# Patient Record
Sex: MALE | Race: WHITE | NOT HISPANIC OR LATINO | Employment: OTHER | URBAN - METROPOLITAN AREA
[De-identification: names, ages, dates, MRNs, and addresses within clinical notes are randomized per-mention and may not be internally consistent; named-entity substitution may affect disease eponyms.]

---

## 2019-01-24 ENCOUNTER — TELEPHONE (OUTPATIENT)
Dept: SCHEDULING | Facility: IMAGING CENTER | Age: 79
End: 2019-01-24

## 2019-02-07 ENCOUNTER — OFFICE VISIT (OUTPATIENT)
Dept: MEDICAL GROUP | Facility: PHYSICIAN GROUP | Age: 79
End: 2019-02-07
Payer: MEDICARE

## 2019-02-07 VITALS
BODY MASS INDEX: 25.11 KG/M2 | DIASTOLIC BLOOD PRESSURE: 76 MMHG | SYSTOLIC BLOOD PRESSURE: 142 MMHG | HEIGHT: 67 IN | RESPIRATION RATE: 14 BRPM | WEIGHT: 160 LBS | HEART RATE: 66 BPM | OXYGEN SATURATION: 97 % | TEMPERATURE: 98.9 F

## 2019-02-07 DIAGNOSIS — K22.70 BARRETT'S ESOPHAGUS WITHOUT DYSPLASIA: ICD-10-CM

## 2019-02-07 DIAGNOSIS — K44.9 HIATAL HERNIA: ICD-10-CM

## 2019-02-07 DIAGNOSIS — R91.8 PULMONARY NODULES: ICD-10-CM

## 2019-02-07 DIAGNOSIS — G83.9 PARALYSIS (HCC): ICD-10-CM

## 2019-02-07 DIAGNOSIS — M06.9 RHEUMATOID ARTHRITIS INVOLVING BOTH HANDS, UNSPECIFIED RHEUMATOID FACTOR PRESENCE: ICD-10-CM

## 2019-02-07 DIAGNOSIS — R60.0 PEDAL EDEMA: ICD-10-CM

## 2019-02-07 DIAGNOSIS — N30.00 ACUTE CYSTITIS WITHOUT HEMATURIA: ICD-10-CM

## 2019-02-07 DIAGNOSIS — K27.9 PEPTIC ULCER: ICD-10-CM

## 2019-02-07 PROCEDURE — 99204 OFFICE O/P NEW MOD 45 MIN: CPT | Performed by: FAMILY MEDICINE

## 2019-02-08 NOTE — ASSESSMENT & PLAN NOTE
He was seen in [facility] on 1/11/2019 -1/13/2019.  He was treated with EGD for bleeding ulcer and discharged with home health.  Discharge diagnosis was h pyhlori positive duodenal ulcer, GI bleed, acute blood loss anemia and UTI.  He required a blood transfusion for the anemia and endoscopy.  Per the discharge summary he was started on quadruple therapy for H pylori and 5 days of antibiotics for his UTI.   He was given 2 weeks of doxycycline and flagyl.

## 2019-02-17 PROBLEM — K44.9 HIATAL HERNIA: Status: ACTIVE | Noted: 2019-02-17

## 2019-02-17 PROBLEM — K22.70 BARRETT'S ESOPHAGUS WITHOUT DYSPLASIA: Status: ACTIVE | Noted: 2019-02-17

## 2019-02-17 PROBLEM — R91.8 PULMONARY NODULES: Status: ACTIVE | Noted: 2019-02-17

## 2019-02-17 PROBLEM — N30.00 ACUTE CYSTITIS WITHOUT HEMATURIA: Status: ACTIVE | Noted: 2019-02-17

## 2019-02-17 RX ORDER — SULFAMETHOXAZOLE AND TRIMETHOPRIM 800; 160 MG/1; MG/1
1 TABLET ORAL 2 TIMES DAILY
COMMUNITY
End: 2019-02-17

## 2019-02-17 RX ORDER — BISMUTH SUBSALICYLATE 262 MG/1
524 TABLET, CHEWABLE ORAL
COMMUNITY
End: 2019-05-07

## 2019-02-17 RX ORDER — PANTOPRAZOLE SODIUM 40 MG/1
40 TABLET, DELAYED RELEASE ORAL 2 TIMES DAILY
COMMUNITY
End: 2019-05-07

## 2019-02-17 RX ORDER — POLYETHYLENE GLYCOL 3350 17 G/17G
17 POWDER, FOR SOLUTION ORAL
COMMUNITY
End: 2019-05-07

## 2019-02-17 RX ORDER — CALCIUM CARBONATE 500 MG/1
500 TABLET, CHEWABLE ORAL PRN
COMMUNITY
End: 2019-05-07

## 2019-02-17 NOTE — ASSESSMENT & PLAN NOTE
A stable 5 mm lateral right lung base pulmonary nodule and 7 mm right middle lobe pulmonary nodule were noted on CT on 1/11/2019 as compared with 11/2018.  Follow up CT recommended in 1 year.

## 2019-02-17 NOTE — PROGRESS NOTES
CC:  Hospital follow up, I need a primary doctor    HISTORY OF THE PRESENT ILLNESS: Patient is a 78 y.o. male. This pleasant patient is here today to establish care after a recent hospitalization    Health Maintenance: Completed      Peptic ulcer  He was seen in [facility] on 1/11/2019 -1/13/2019.  He was treated with EGD for bleeding ulcer and discharged with home health.  Discharge diagnosis was h pyhlori positive duodenal ulcer, GI bleed, acute blood loss anemia and UTI.  He required a blood transfusion for the anemia and endoscopy.  Per the discharge summary he was started on quadruple therapy for H pylori and 5 days of antibiotics for his UTI.   He was given 2 weeks of doxycycline and flagyl.    Acute cystitis without hematuria  This was treated with IV antibiotics during his hospitalization and with 5 days of bactrim on discharge.  He denies symptoms.    Oconnor's esophagus without dysplasia  This was diagnosed on endoscopy on 1/12/2019.  Biopsies were sent.    Hiatal hernia  This was also noted on endoscopy.  Described as small.    Pulmonary nodules  A stable 5 mm lateral right lung base pulmonary nodule and 7 mm right middle lobe pulmonary nodule were noted on CT on 1/11/2019 as compared with 11/2018.  Follow up CT recommended in 1 year.      Paralysis (HCC)  He has a history of polio in 1955 and is currently wheelchair bound.  He still lives alone       Allergies: Patient has no known allergies.    Current Outpatient Prescriptions Ordered in [facility]   Medication Sig Dispense Refill   • calcium carbonate (TUMS) 500 MG Chew Tab Take 500 mg by mouth as needed.     • bismuth subsalicylate (PEPTO-BISMOL) 262 MG Chew Tab Take 524 mg by mouth 4 Times a Day,Before Meals and at Bedtime.     • polyethylene glycol/lytes (MIRALAX) Pack Take 17 g by mouth 1 time daily as needed.     • pantoprazole (PROTONIX) 40 MG Tablet Delayed Response Take 40 mg by mouth 2 Times a Day.       No current [facility]-ordered facility-administered  "medications on file.        Past Medical History:   Diagnosis Date   • Polio 1955       Past Surgical History:   Procedure Laterality Date   • EGD W/CONTROL BLEEDING  2019       Social History   Substance Use Topics   • Smoking status: Former Smoker     Packs/day: 0.30     Years: 60.00     Types: Cigarettes     Quit date: 1/21/2019   • Smokeless tobacco: Never Used   • Alcohol use No       Social History     Social History Narrative    Lives independently, has meals on wheels,        Family History   Problem Relation Age of Onset   • Arthritis Mother    • Other Father         ulcer       ROS:     - Constitutional: Negative for fever, chills, unexpected weight change, and fatigue/generalized weakness.     - HEENT: Negative for headaches, vision changes, hearing changes, ear pain, ear discharge, rhinorrhea, sinus congestion, sore throat, and neck pain.      - Respiratory: Negative for cough, sputum production, chest congestion, dyspnea, wheezing, and crackles.      - Cardiovascular: Negative for chest pain, palpitations, orthopnea, and bilateral lower extremity edema.     - Gastrointestinal: Negative for heartburn, nausea, vomiting, abdominal pain, hematochezia, melena, diarrhea, constipation, and greasy/foul-smelling stools.     - Genitourinary: Negative for dysuria, polyuria, hematuria, pyuria, urinary urgency, and urinary incontinence.    - Musculoskeletal: Negative for myalgias, back pain, and joint pain.     - Skin: Negative for rash, itching, cyanotic skin color change.     - Neurological: Negative for dizziness, tingling, tremors,headaches.     - Endo/Heme/Allergies: Does not bruise/bleed easily.     - Psychiatric/Behavioral: Negative for depression, suicidal/homicidal ideation and memory loss.      Exam: Blood pressure 142/76, pulse 66, temperature 37.2 °C (98.9 °F), resp. rate 14, height 1.702 m (5' 7\"), weight 72.6 kg (160 lb), SpO2 97 %. Body mass index is 25.06 kg/m².    General: well appearing elderly " male in  A power chair. No distress.  HEENT: Normocephalic. Eyes conjunctiva clear lids without ptosis, pupils equal and reactive to light accommodation, ears normal shape and contour, canals are clear bilaterally, tympanic membranes are benign, nasal mucosa benign, oropharynx is without erythema, edema or exudates.   Neck: Supple without JVD or bruit. Thyroid is not enlarged.  Pulmonary: Clear to ausculation.  Normal effort. No rales, ronchi, or wheezing.  Cardiovascular: Regular rate and rhythm without murmur. Carotid and radial pulses are intact and equal bilaterally.  Abdomen: Soft, nontender, nondistended. Normal bowel sounds. Liver and spleen are not palpable  Neurologic: strength in arms intact  Lymph: No cervical, supraclavicular or axillary lymph nodes are palpable  Skin: Warm and dry.  No obvious lesions.  Musculoskeletal -bilateral hands with swelling in MCP joints and mild erythema, bilateral pedal edema  Psych: Normal mood and affect. Alert and oriented x3. Judgment and insight is normal.    Please note that this dictation was created using voice recognition software. I have made every reasonable attempt to correct obvious errors, but I expect that there are errors of grammar and possibly content that I did not discover before finalizing the note.      Assessment/Plan  1. Peptic ulcer  He has been treated for H pylori and is on PPIs.  He denies ongoing pain or bleeding.  He will follow up with GI as scheduled.    2. Paralysis (HCC)  This is chronic and he is not concerned about his ability at home.  He appears to have been set up with homecare which is appropriate.  He also gets meals on wheels.    3. Pedal edema  This may be due to inactivity compounded by anemia.    4. Rheumatoid arthritis involving both hands, unspecified rheumatoid factor presence (HCC)  He is not on active treatment for this.  If this is his diagnosis he probably should be seen by rheumatology to prevent further joint destruction.   He is a relatively poor historian and is alone today.  We will need medical records.    5. Acute cystitis without hematuria  There are no active symptoms at this time.  A bladder diverticula was noted on his CT, this may be why he follows with urology.      6. Oconnor's esophagus without dysplasia  Will need regular EGDs to follow, depending on pathology which is not available at this time.    7. Hiatal hernia  Continue PPI.    8. Pulmonary nodules  These are small and will need CT follow up in a year.

## 2019-02-17 NOTE — ASSESSMENT & PLAN NOTE
This was treated with IV antibiotics during his hospitalization and with 5 days of bactrim on discharge.  He denies symptoms.

## 2019-05-07 ENCOUNTER — OFFICE VISIT (OUTPATIENT)
Dept: MEDICAL GROUP | Facility: PHYSICIAN GROUP | Age: 79
End: 2019-05-07
Payer: MEDICARE

## 2019-05-07 ENCOUNTER — TELEPHONE (OUTPATIENT)
Dept: MEDICAL GROUP | Facility: PHYSICIAN GROUP | Age: 79
End: 2019-05-07

## 2019-05-07 VITALS
HEIGHT: 67 IN | HEART RATE: 63 BPM | TEMPERATURE: 99.1 F | RESPIRATION RATE: 16 BRPM | WEIGHT: 160 LBS | OXYGEN SATURATION: 96 % | BODY MASS INDEX: 25.11 KG/M2 | DIASTOLIC BLOOD PRESSURE: 64 MMHG | SYSTOLIC BLOOD PRESSURE: 118 MMHG

## 2019-05-07 DIAGNOSIS — K27.9 PEPTIC ULCER: ICD-10-CM

## 2019-05-07 DIAGNOSIS — D50.8 OTHER IRON DEFICIENCY ANEMIA: ICD-10-CM

## 2019-05-07 DIAGNOSIS — M06.9 RHEUMATOID ARTHRITIS INVOLVING BOTH HANDS, UNSPECIFIED RHEUMATOID FACTOR PRESENCE: ICD-10-CM

## 2019-05-07 PROBLEM — N30.00 ACUTE CYSTITIS WITHOUT HEMATURIA: Status: RESOLVED | Noted: 2019-02-17 | Resolved: 2019-05-07

## 2019-05-07 PROBLEM — D64.9 ABSOLUTE ANEMIA: Status: ACTIVE | Noted: 2019-05-07

## 2019-05-07 PROCEDURE — 99214 OFFICE O/P EST MOD 30 MIN: CPT | Performed by: FAMILY MEDICINE

## 2019-05-07 NOTE — PROGRESS NOTES
CC: hand swollen    HISTORY OF PRESENT ILLNESS: Patient is a 78 y.o. male established patient who presents today to discuss the following chronic concerns    Health Maintenance: colonoscopy and vaccines declined, he believes he has had colonoscopy before    Rheumatoid arthritis of hand  He was previously treated for this, he believes with Dr. Potter, but stopped going because he felt the medication wasn't helping and the dose was too high.  His hands have been swollen recently, right worse than left.  He denies penetrating or blunt trauma or symptoms of infection.  He is only using topical NSAIDs, no oral medications.    Absolute anemia  He was seen in Dr. Alfaro's office since last visit.  He said he was told his repiratory test was negative but that his blood count was low. He is no longer on any medications.    Peptic ulcer  He denies any abdominal pain or hematochezia or melena.  He is no longer on medications.        Patient Active Problem List    Diagnosis Date Noted   • Absolute anemia 05/07/2019   • Oconnor's esophagus without dysplasia 02/17/2019   • Hiatal hernia 02/17/2019   • Pulmonary nodules 02/17/2019   • Peptic ulcer 02/07/2019   • Paralysis ([facility]) 02/07/2019   • Pedal edema 02/07/2019   • Rheumatoid arthritis of hand ([facility]) 10/02/2015      Allergies:Patient has no known allergies.    Current Outpatient Prescriptions   Medication Sig Dispense Refill   • Diclofenac Sodium 1 % Gel Apply 1 Application to skin as directed 2 times a day. 1 Tube 2     No current facility-administered medications for this visit.        Social History   Substance Use Topics   • Smoking status: Former Smoker     Packs/day: 0.30     Years: 60.00     Types: Cigarettes     Quit date: 1/21/2019   • Smokeless tobacco: Never Used   • Alcohol use No     Social History     Social History Narrative    Lives independently, has meals on wheels,        Family History   Problem Relation Age of Onset   • Arthritis Mother    • Other  "Father         ulcer       Review of Systems:      - Constitutional: Negative for fever, chills, unexpected weight change, and fatigue/generalized weakness.     - Respiratory: Negative for cough, sputum production, chest congestion, dyspnea, wheezing, and crackles.      - Cardiovascular: Negative for chest pain, palpitations, orthopnea, and bilateral lower extremity edema.     - Gastrointestinal: Negative for heartburn, nausea, vomiting, abdominal pain, hematochezia, melena, diarrhea, constipation, and greasy/foul-smelling stools.     - Genitourinary: Negative for dysuria, polyuria, hematuria, pyuria, urinary urgency, and urinary incontinence.    -     Exam:   /64 (BP Location: Left arm, Patient Position: Sitting, BP Cuff Size: Adult)   Pulse 63   Temp 37.3 °C (99.1 °F) (Temporal)   Resp 16   Ht 1.702 m (5' 7\")   Wt 72.6 kg (160 lb)   SpO2 96%  Body mass index is 25.06 kg/m².    General:  Well nourished, well developed male in NAD  Head is grossly normal.  Neck: Supple without JVD or bruit. Thyroid is not enlarged.  Pulmonary: Clear to ausculation and percussion.  Normal effort. No rales, ronchi, or wheezing.  Cardiovascular: Regular rate and rhythm without murmur. Carotid and radial pulses are intact and equal bilaterally.  Extremities: no clubbing, cyanosis, or edema.  Musculoskeletal: deformity of bilateral hands with swelling of the joints and restriction of ROM, right 2nd MCP and PIP joints are swollen without skin changes or increased warmth.  No skin lesions        Assessment/Plan:  1. Peptic ulcer  He is no longer having symptoms and has followed up with GI.  I have asked for records regarding this.  I have advised him to avoid oral NSAIDs and monitor symptoms.  We will follow his CBC for improvement.    2. Rheumatoid arthritis involving both hands, unspecified rheumatoid factor presence (HCC)  He appears to be having an RA flare.  There are no signs of trauma or infectious tenosynovitis.  He may " use topical diclofenac for now and I have advised him that he will likely need a rheumatologist to go forward with treatment of this.  I have ordered an xray and labs to evaluate further.  - Diclofenac Sodium 1 % Gel; Apply 1 Application to skin as directed 2 times a day.  Dispense: 1 Tube; Refill: 2  - DX-HAND 3+ RIGHT; Future  - WESTERGREN SED RATE; Future  - CRP QUANTITIVE (NON-CARDIAC); Future  - RHEUMATOID ARTHRITIS FACTOR; Future    3. Other iron deficiency anemia  Will request previous records, Hg was 8.5 when he arrived during his last admission but I do not have any labs from discharge and he required a transfusion.  Will follow CBC.    - CBC WITH DIFFERENTIAL; Future  - FERRITIN; Future

## 2019-05-07 NOTE — ASSESSMENT & PLAN NOTE
He was previously treated for this, he believes with Dr. Potter, but stopped going because he felt the medication wasn't helping and the dose was too high.  His hands have been swollen recently, right worse than left.  He denies penetrating or blunt trauma or symptoms of infection.  He is only using topical NSAIDs, no oral medications.

## 2019-05-07 NOTE — ASSESSMENT & PLAN NOTE
He was seen in Dr. Alfaro's office since last visit.  He said he was told his repiratory test was negative but that his blood count was low. He is no longer on any medications.

## 2019-05-07 NOTE — LETTER
Critical access hospital  Larissa Gomez M.D.  [address]  [facility] [address]  Fax: [phone]   Authorization for Release/Disclosure of   Protected Health Information   Name: SID RAMIRES : 1940 SSN: [id number]   Address: [address] [place] [address] [facility] [id number] Phone:    [phone] (home)    I authorize the entity listed below to release/disclose the PHI below to:   Critical access hospital/Larissa Gomez M.D. and Larissa Gomez M.D.   Provider or Entity Name:     Address   City, State, Zip   Phone:      Fax:     Reason for request: continuity of care   Information to be released:    [  ] LAST COLONOSCOPY,  including any PATH REPORT and follow-up  [  ] LAST FIT/COLOGUARD RESULT [  ] LAST DEXA  [  ] LAST MAMMOGRAM  [  ] LAST PAP  [  ] LAST LABS [  ] RETINA EXAM REPORT  [  ] IMMUNIZATION RECORDS  [  ] Release all info      [  ] Check here and initial the line next to each item to release ALL health information INCLUDING  _____ Care and treatment for drug and / or alcohol abuse  _____ HIV testing, infection status, or AIDS  _____ Genetic Testing    DATES OF SERVICE OR TIME PERIOD TO BE DISCLOSED: _____________  I understand and acknowledge that:  * This Authorization may be revoked at any time by you in writing, except if your health information has already been used or disclosed.  * Your health information that will be used or disclosed as a result of you signing this authorization could be re-disclosed by the recipient. If this occurs, your re-disclosed health information may no longer be protected by State or Federal laws.  * You may refuse to sign this Authorization. Your refusal will not affect your ability to obtain treatment.  * This Authorization becomes effective upon signing and will  on (date) __________.      If no date is indicated, this Authorization will  one (1) year from the signature date.    Name: Sid Ramires    Signature:   Date:     2019       PLEASE  FAX REQUESTED RECORDS BACK TO: [phone]

## 2019-05-13 ENCOUNTER — TELEPHONE (OUTPATIENT)
Dept: MEDICAL GROUP | Facility: PHYSICIAN GROUP | Age: 79
End: 2019-05-13

## 2019-05-13 DIAGNOSIS — M06.9 RHEUMATOID ARTHRITIS INVOLVING BOTH HANDS, UNSPECIFIED RHEUMATOID FACTOR PRESENCE: ICD-10-CM

## 2019-05-13 PROBLEM — D50.0 IRON DEFICIENCY ANEMIA DUE TO CHRONIC BLOOD LOSS: Status: ACTIVE | Noted: 2019-05-07

## 2020-01-02 ENCOUNTER — OFFICE VISIT (OUTPATIENT)
Dept: MEDICAL GROUP | Facility: PHYSICIAN GROUP | Age: 80
End: 2020-01-02
Payer: MEDICARE

## 2020-01-02 VITALS
HEART RATE: 60 BPM | WEIGHT: 160 LBS | BODY MASS INDEX: 22.9 KG/M2 | TEMPERATURE: 96.9 F | HEIGHT: 70 IN | DIASTOLIC BLOOD PRESSURE: 70 MMHG | SYSTOLIC BLOOD PRESSURE: 136 MMHG | OXYGEN SATURATION: 97 %

## 2020-01-02 DIAGNOSIS — N40.1 BENIGN PROSTATIC HYPERPLASIA WITH POST-VOID DRIBBLING: ICD-10-CM

## 2020-01-02 DIAGNOSIS — L89.613 PRESSURE INJURY OF RIGHT HEEL, STAGE 3 (HCC): ICD-10-CM

## 2020-01-02 DIAGNOSIS — D50.0 IRON DEFICIENCY ANEMIA DUE TO CHRONIC BLOOD LOSS: ICD-10-CM

## 2020-01-02 DIAGNOSIS — N30.00 ACUTE CYSTITIS WITHOUT HEMATURIA: ICD-10-CM

## 2020-01-02 DIAGNOSIS — N39.43 BENIGN PROSTATIC HYPERPLASIA WITH POST-VOID DRIBBLING: ICD-10-CM

## 2020-01-02 DIAGNOSIS — K22.70 BARRETT'S ESOPHAGUS WITHOUT DYSPLASIA: ICD-10-CM

## 2020-01-02 DIAGNOSIS — M06.9 RHEUMATOID ARTHRITIS INVOLVING BOTH HANDS, UNSPECIFIED RHEUMATOID FACTOR PRESENCE: ICD-10-CM

## 2020-01-02 PROBLEM — N40.0 BPH (BENIGN PROSTATIC HYPERPLASIA): Status: ACTIVE | Noted: 2020-01-02

## 2020-01-02 PROCEDURE — 99214 OFFICE O/P EST MOD 30 MIN: CPT | Performed by: FAMILY MEDICINE

## 2020-01-02 RX ORDER — ACETAMINOPHEN 325 MG/1
650 TABLET ORAL EVERY 4 HOURS PRN
COMMUNITY

## 2020-01-02 RX ORDER — AMLODIPINE BESYLATE 10 MG/1
10 TABLET ORAL DAILY
COMMUNITY
End: 2020-01-28

## 2020-01-02 RX ORDER — OMEPRAZOLE 40 MG/1
40 CAPSULE, DELAYED RELEASE ORAL DAILY
Qty: 90 CAP | Refills: 1 | Status: SHIPPED
Start: 2020-01-02 | End: 2020-01-28

## 2020-01-02 RX ORDER — PREDNISONE 10 MG/1
10 TABLET ORAL DAILY
COMMUNITY
End: 2020-01-28

## 2020-01-02 RX ORDER — TAMSULOSIN HYDROCHLORIDE 0.4 MG/1
0.4 CAPSULE ORAL
COMMUNITY

## 2020-01-02 RX ORDER — ZINC SULFATE 50(220)MG
220 CAPSULE ORAL DAILY
COMMUNITY

## 2020-01-02 RX ORDER — FUROSEMIDE 20 MG/1
20 TABLET ORAL 2 TIMES DAILY
COMMUNITY

## 2020-01-02 RX ORDER — ASCORBIC ACID 500 MG
500 TABLET ORAL DAILY
COMMUNITY

## 2020-01-02 RX ORDER — FAMOTIDINE 20 MG/1
20 TABLET, FILM COATED ORAL 2 TIMES DAILY
COMMUNITY
End: 2020-01-02 | Stop reason: SDUPTHER

## 2020-01-02 RX ORDER — FINASTERIDE 5 MG/1
5 TABLET, FILM COATED ORAL DAILY
COMMUNITY
End: 2020-01-28

## 2020-01-02 RX ORDER — FAMOTIDINE 20 MG/1
20 TABLET, FILM COATED ORAL
COMMUNITY
Start: 2020-01-02 | End: 2020-01-28

## 2020-01-02 NOTE — ASSESSMENT & PLAN NOTE
He has a right heel ulcer for which he is following with wound care.  They are treating it with iodine twice a day and changing the dressings.  He does not have homecare in place.  He lives alone and there is concern that he is not drinking or eating consistently.

## 2020-01-02 NOTE — ASSESSMENT & PLAN NOTE
Last CBC showed a Hg of 11.9.  This is may be a combination of iron deficiency and chronic disease.

## 2020-01-02 NOTE — ASSESSMENT & PLAN NOTE
He was recently admitted at [facility] for a urinary tract infection from 12/6/2019 to 12/10/2019.  He was treated with IV antibiotics and discharged on omnicef for suspected prostatitis. Blood culture were negative He is on antibiotics until tomorrow.  He denies any abdominal pain or changes in urine smell today.  He is weak and his daughter is concerned about his functioning at home.

## 2020-01-02 NOTE — PROGRESS NOTES
CC: foot wound    HISTORY OF PRESENT ILLNESS: Patient is a 79 y.o. male established patient who presents today to discuss the following new concerns after a rehab stay, discharged 12/31/19    Health Maintenance: Completed    Acute cystitis  He was recently admitted at [facility] for a urinary tract infection from 12/6/2019 to 12/10/2019.  He was treated with IV antibiotics and discharged on omnicef for suspected prostatitis. Blood culture were negative He is on antibiotics until tomorrow.  He denies any abdominal pain or changes in urine smell today.  He is weak and his daughter is concerned about his functioning at home.    BPH (benign prostatic hyperplasia)  He remains on flomax and finasteride.  He follows with Dr. Severino.  He does have some urinary incontinence but is able to void.  A monsivais was used in the hospital but was discontinued in rehab.    Pressure injury of right heel, stage 3 (HCC)  He has a right heel ulcer for which he is following with wound care.  They are treating it with iodine twice a day and changing the dressings.  He does not have homecare in place.  He lives alone and there is concern that he is not drinking or eating consistently.    Rheumatoid arthritis of hand  He was started on systemic steroids in the hospital due to multiple falls felt to be related to RA.  He is still on oral prednisone.  He is to follow up with Dr. Potter but does not have an appointment for several months.  Swelling and stiffness improved in the hospital but he does have chronic deformity.      Iron deficiency anemia due to chronic blood loss  Last CBC showed a Hg of 11.9.  This is may be a combination of iron deficiency and chronic disease.        Patient Active Problem List    Diagnosis Date Noted   • Pressure injury of right heel, stage 3 (HCC) 01/02/2020   • BPH (benign prostatic hyperplasia) 01/02/2020   • Iron deficiency anemia due to chronic blood loss 05/07/2019   • Acute cystitis 02/17/2019   • Oconnor's  esophagus without dysplasia 2019   • Hiatal hernia 2019   • Pulmonary nodules 2019   • Peptic ulcer 2019   • Paralysis (HCC) 2019   • Pedal edema 2019   • Rheumatoid arthritis of hand ([facility]) 10/02/2015      Allergies:Patient has no known allergies.    Current Outpatient Medications   Medication Sig Dispense Refill   • acetaminophen (TYLENOL) 325 MG Tab Take 650 mg by mouth every four hours as needed.     • tamsulosin (FLOMAX) 0.4 MG capsule Take 0.4 mg by mouth ONE-HALF HOUR AFTER BREAKFAST.     • magnesium hydroxide (MILK OF MAGNESIA) 400 MG/5ML Suspension Take 30 mL by mouth 1 time daily as needed.     • finasteride (PROSCAR) 5 MG Tab Take 5 mg by mouth every day.     • predniSONE (DELTASONE) 10 MG Tab Take 10 mg by mouth every day.     • ascorbic acid (VITAMIN C) 500 MG tablet Take 500 mg by mouth every day.     • zinc sulfate (ZINCATE) 220 (50 Zn) MG Cap Take 220 mg by mouth every day.     • Multiple Vitamin (MULTIVITAMINS PO) Take  by mouth.     • furosemide (LASIX) 20 MG Tab Take 20 mg by mouth 2 times a day.     • amLODIPine (NORVASC) 10 MG Tab Take 10 mg by mouth every day.     • LACTOBACILLUS PO Take  by mouth.     • mupirocin (BACTROBAN) 2 % Ointment Apply 1 Application to affected area(s) every day. 1 Tube 0   • famotidine (PEPCID) 20 MG Tab Take 1 Tab by mouth 2 times daily with meals as needed.     • omeprazole (PRILOSEC) 40 MG delayed-release capsule Take 1 Cap by mouth every day for 90 days. 90 Cap 1     No current facility-administered medications for this visit.        Social History     Tobacco Use   • Smoking status: Former Smoker     Packs/day: 0.30     Years: 60.00     Pack years: 18.00     Types: Cigarettes     Last attempt to quit: 2019     Years since quittin.9   • Smokeless tobacco: Never Used   Substance Use Topics   • Alcohol use: No   • Drug use: No     Social History     Patient does not qualify to have social determinant information on file  "(likely too young).   Social History Narrative    Lives independently, has meals on wheels,        Family History   Problem Relation Age of Onset   • Arthritis Mother    • Other Father         ulcer       Review of Systems:      - Constitutional: Negative for fever, chills, unexpected weight change, and fatigue/generalized weakness.     - HEENT: Negative for headaches, vision changes, hearing changes, ear pain, ear discharge, rhinorrhea, sinus congestion, sore throat, and neck pain.      - Respiratory: Negative for cough, sputum production, chest congestion, dyspnea, wheezing, and crackles.      - Cardiovascular: Negative for chest pain, palpitations, orthopnea, and bilateral lower extremity edema.     - Gastrointestinal: Negative for heartburn, nausea, vomiting, abdominal pain, hematochezia, melena, diarrhea, constipation, and greasy/foul-smelling stools.     - Genitourinary: Negative for dysuria, polyuria, hematuria, pyuria, urinary urgency    - Musculoskeletal: Negative for myalgias, back pain, and joint pain.       Exam:    /70   Pulse 60   Temp 36.1 °C (96.9 °F)   Ht 1.778 m (5' 10\")   Wt 72.6 kg (160 lb)   SpO2 97%  Body mass index is 22.96 kg/m².    General:  Thin elderly man in a motorized wheelchair  Head is groell nourished,   Neck: Supple without JVD or bruit. Thyroid is not enlarged.  Pulmonary: Clear to ausculation and percussion.  Normal effort. No rales, ronchi, or wheezing.  Cardiovascular: Regular rate and rhythm without murmur. Carotid and radial pulses are intact and equal bilaterally.  Abdomen: not tender or distended  Extremities: bilateral ankle edema, RA deformities in his hands  Skin:  Right heel with central ~ 2.5 x1 cm wound oozing serosanguinous fluid, surrounding shallow blister is larger, no evidence of infection          Assessment/Plan:  1. Acute cystitis without hematuria  He is recovered and doing well on prolonged antibiotics.  He may stop these tomorrow as planned.  He " is encouraged to maintain hydration.      2. Rheumatoid arthritis involving both hands, unspecified rheumatoid factor presence (HCC)  He is on prednisone for this and had some improvement in the hospital.  He will need to establish with Dr. Potter again for other therapies.    3. Pressure injury of right heel, stage 3 (HCC)  This may be related to poor nutrition and trauma vs pressure ulcer.  It may be that edema contributed.  He is current with wound care.  Will refer to home health for help with wound care and overall assessment, he needs PT as well.  They may start using topical antibiotic ointment as well.  - REFERRAL TO HOME HEALTH  - mupirocin (BACTROBAN) 2 % Ointment; Apply 1 Application to affected area(s) every day.  Dispense: 1 Tube; Refill: 0    4. Oconnor's esophagus without dysplasia  Reviewed this diagnosis with him.  He should be on lifelong PPI use.  - famotidine (PEPCID) 20 MG Tab; Take 1 Tab by mouth 2 times daily with meals as needed.  - omeprazole (PRILOSEC) 40 MG delayed-release capsule; Take 1 Cap by mouth every day for 90 days.  Dispense: 90 Cap; Refill: 1    5. Benign prostatic hyperplasia with post-void dribbling  Continue dual therapy, follow with Dr. Severino    6. Iron deficiency anemia due to chronic blood loss  Continue iron supplementation.  May also be chronic disease.

## 2020-01-02 NOTE — ASSESSMENT & PLAN NOTE
He was started on systemic steroids in the hospital due to multiple falls felt to be related to RA.  He is still on oral prednisone.  He is to follow up with Dr. Potter but does not have an appointment for several months.  Swelling and stiffness improved in the hospital but he does have chronic deformity.

## 2020-01-02 NOTE — ASSESSMENT & PLAN NOTE
He remains on flomax and finasteride.  He follows with Dr. Severino.  He does have some urinary incontinence but is able to void.  A monsivais was used in the hospital but was discontinued in rehab.

## 2020-01-08 ENCOUNTER — TELEPHONE (OUTPATIENT)
Dept: URGENT CARE | Facility: CLINIC | Age: 80
End: 2020-01-08

## 2020-01-08 NOTE — TELEPHONE ENCOUNTER
LVM:      Camille Rice from [facility] needs an order for patient from Dr. Gomez. Please call at  [phone]

## 2020-01-10 NOTE — TELEPHONE ENCOUNTER
Mari called again stating that they are needing a butch lift order for patient. I called her back and left a message to find out what company we need to go through to get this. I am unsure of how we can start this for the patient.

## 2020-01-16 DIAGNOSIS — R91.8 PULMONARY NODULES: ICD-10-CM

## 2020-01-16 NOTE — PROGRESS NOTES
Pt needs follow up chest CT for pulmonary nodules.  Order is signed, please fax to imaging location of choice for the patient and let him know.  We can talk about it more when I see him next.  Thanks

## 2020-01-27 ENCOUNTER — TELEPHONE (OUTPATIENT)
Dept: MEDICAL GROUP | Facility: PHYSICIAN GROUP | Age: 80
End: 2020-01-27

## 2020-01-27 NOTE — TELEPHONE ENCOUNTER
[facility] stopped by stating that they are needing an order for a butch lift for patient. They do not have a specific company where the order needs to go.

## 2020-01-28 ENCOUNTER — OFFICE VISIT (OUTPATIENT)
Dept: MEDICAL GROUP | Facility: PHYSICIAN GROUP | Age: 80
End: 2020-01-28
Payer: MEDICARE

## 2020-01-28 VITALS
SYSTOLIC BLOOD PRESSURE: 142 MMHG | BODY MASS INDEX: 22.96 KG/M2 | OXYGEN SATURATION: 98 % | HEIGHT: 70 IN | TEMPERATURE: 98.5 F | HEART RATE: 75 BPM | RESPIRATION RATE: 18 BRPM | DIASTOLIC BLOOD PRESSURE: 90 MMHG

## 2020-01-28 DIAGNOSIS — G83.9 PARALYSIS (HCC): ICD-10-CM

## 2020-01-28 DIAGNOSIS — L89.613 PRESSURE INJURY OF RIGHT HEEL, STAGE 3 (HCC): ICD-10-CM

## 2020-01-28 DIAGNOSIS — L98.421 SACRAL ULCER, LIMITED TO BREAKDOWN OF SKIN (HCC): ICD-10-CM

## 2020-01-28 PROBLEM — N40.0 BPH (BENIGN PROSTATIC HYPERPLASIA): Status: RESOLVED | Noted: 2020-01-02 | Resolved: 2020-01-28

## 2020-01-28 PROBLEM — N30.00 ACUTE CYSTITIS: Status: RESOLVED | Noted: 2019-02-17 | Resolved: 2020-01-28

## 2020-01-28 PROBLEM — K27.9 PEPTIC ULCER: Status: RESOLVED | Noted: 2019-02-07 | Resolved: 2020-01-28

## 2020-01-28 PROCEDURE — 99214 OFFICE O/P EST MOD 30 MIN: CPT | Performed by: FAMILY MEDICINE

## 2020-01-28 ASSESSMENT — ENCOUNTER SYMPTOMS
CARDIOVASCULAR NEGATIVE: 1
FEVER: 0
BRUISES/BLEEDS EASILY: 0
PALPITATIONS: 0
PSYCHIATRIC NEGATIVE: 1
CONSTITUTIONAL NEGATIVE: 1
NAUSEA: 0
MYALGIAS: 0
EYES NEGATIVE: 1
CHILLS: 0
TINGLING: 0
BLURRED VISION: 0
HEMOPTYSIS: 0
FOCAL WEAKNESS: 1
HEADACHES: 0
DIZZINESS: 0
RESPIRATORY NEGATIVE: 1
DEPRESSION: 0
DOUBLE VISION: 0
COUGH: 0
HEARTBURN: 0
GASTROINTESTINAL NEGATIVE: 1
MUSCULOSKELETAL NEGATIVE: 1

## 2020-01-28 ASSESSMENT — PATIENT HEALTH QUESTIONNAIRE - PHQ9: CLINICAL INTERPRETATION OF PHQ2 SCORE: 0

## 2020-01-29 NOTE — PROGRESS NOTES
Subjective:      Sid Ca is a 79 y.o. male who presents with Foot Swelling (Home nurse wants his swelling to be checked out) and Wound Check (Shaun, present sore on right leg)            Patient is a 78 y/o who has been wc bound due to post polio syndrome and RA for over 20 years. Step daughter who is caregiver noticed a redness on his sacrum last week and wants eval    1. Paralysis (HCC)    - REFERRAL TO WOUND CLINIC  - NON SPECIFIED; He lift  Dispense: 1 Each; Refill: 0    2. Pressure injury of right heel, stage 3 (HCC)  This has been treated by wound care HH and doing well with some eschar but no infection  - REFERRAL TO WOUND CLINIC  - NON SPECIFIED; He lift  Dispense: 1 Each; Refill: 0    3. Sacral ulcer, limited to breakdown of skin ([facility])  Two areas of erythema, one on buttock cleft and the other on left upper buttock, mild skin irritation but no d/c nor induration nor fluctuance  Will send to wound care clinic for eval and padding for chair  - REFERRAL TO WOUND CLINIC  - NON SPECIFIED; He lift  Dispense: 1 Each; Refill: 0    Past Medical History:  1955: Polio  Past Surgical History:  2019: EGD W/CONTROL BLEEDING  No date: CARPAL TUNNEL RELEASE; Left  Social History    Tobacco Use      Smoking status: Former Smoker        Packs/day: 0.30        Years: 60.00        Pack years: 18        Types: Cigarettes        Quit date: 2019        Years since quittin.0      Smokeless tobacco: Never Used    Alcohol use: No    Drug use: No    Review of patient's family history indicates:  Problem: Arthritis      Relation: Mother          Age of Onset: (Not Specified)  Problem: Other      Relation: Father          Age of Onset: (Not Specified)          Comment: ulcer      Current Outpatient Medications: •  NON SPECIFIED, He lift, Disp: 1 Each, Rfl: 0•  NON SPECIFIED, 1 Each. He Lift for home use, Disp: , Rfl: •  acetaminophen (TYLENOL) 325 MG Tab, Take 650 mg by mouth every four hours as  needed., Disp: , Rfl: •  tamsulosin (FLOMAX) 0.4 MG capsule, Take 0.4 mg by mouth ONE-HALF HOUR AFTER BREAKFAST., Disp: , Rfl: •  magnesium hydroxide (MILK OF MAGNESIA) 400 MG/5ML Suspension, Take 30 mL by mouth 1 time daily as needed., Disp: , Rfl: •  ascorbic acid (VITAMIN C) 500 MG tablet, Take 500 mg by mouth every day., Disp: , Rfl: •  zinc sulfate (ZINCATE) 220 (50 Zn) MG Cap, Take 220 mg by mouth every day., Disp: , Rfl: •  Multiple Vitamin (MULTIVITAMINS PO), Take  by mouth., Disp: , Rfl: •  furosemide (LASIX) 20 MG Tab, Take 20 mg by mouth 2 times a day., Disp: , Rfl: •  LACTOBACILLUS PO, Take  by mouth., Disp: , Rfl: •  mupirocin (BACTROBAN) 2 % Ointment, Apply 1 Application to affected area(s) every day., Disp: 1 Tube, Rfl: 0    Patient was instructed on the use of medications, either prescriptions or OTC and informed on when the appropriate follow up time period should be. In addition, patient was also instructed that should any acute worsening occur that they should notify this clinic asap or call 911.          Review of Systems   Constitutional: Negative.  Negative for chills and fever.   HENT: Negative.  Negative for hearing loss.    Eyes: Negative.  Negative for blurred vision and double vision.   Respiratory: Negative.  Negative for cough and hemoptysis.    Cardiovascular: Negative.  Negative for chest pain and palpitations.   Gastrointestinal: Negative.  Negative for heartburn and nausea.   Genitourinary: Negative.  Negative for dysuria.   Musculoskeletal: Negative.  Negative for myalgias.   Skin: Negative.  Negative for rash.   Neurological: Positive for focal weakness. Negative for dizziness, tingling and headaches.   Endo/Heme/Allergies: Negative.  Does not bruise/bleed easily.   Psychiatric/Behavioral: Negative.  Negative for depression and suicidal ideas.   All other systems reviewed and are negative.         Objective:     /90   Pulse 75   Temp 36.9 °C (98.5 °F) (Temporal)   Resp 18   " Ht 1.778 m (5' 10\")   SpO2 98%   BMI 22.96 kg/m²      Physical Exam  Vitals signs and nursing note reviewed.   Constitutional:       General: He is not in acute distress.     Appearance: He is well-developed. He is not diaphoretic.   HENT:      Head: Normocephalic and atraumatic.      Mouth/Throat:      Pharynx: No oropharyngeal exudate.   Eyes:      Pupils: Pupils are equal, round, and reactive to light.   Cardiovascular:      Rate and Rhythm: Normal rate and regular rhythm.      Heart sounds: Normal heart sounds. No murmur. No friction rub. No gallop.    Pulmonary:      Effort: Pulmonary effort is normal. No respiratory distress.      Breath sounds: Normal breath sounds. No wheezing or rales.   Chest:      Chest wall: No tenderness.   Skin:     Findings: Erythema present.          Neurological:      Mental Status: He is alert and oriented to person, place, and time.   Psychiatric:         Behavior: Behavior normal.         Thought Content: Thought content normal.         Judgment: Judgment normal.                 Assessment/Plan:       1. Paralysis ([facility])    - REFERRAL TO WOUND CLINIC  - NON SPECIFIED; He lift  Dispense: 1 Each; Refill: 0    2. Pressure injury of right heel, stage 3 ([facility])    - REFERRAL TO WOUND CLINIC  - NON SPECIFIED; He lift  Dispense: 1 Each; Refill: 0    3. Sacral ulcer, limited to breakdown of skin ([facility])  - REFERRAL TO WOUND CLINIC  - NON SPECIFIED; He lift  Dispense: 1 Each; Refill: 0    "

## 2021-01-11 DIAGNOSIS — Z23 NEED FOR VACCINATION: ICD-10-CM
